# Patient Record
Sex: FEMALE | Race: WHITE | Employment: UNEMPLOYED | URBAN - METROPOLITAN AREA
[De-identification: names, ages, dates, MRNs, and addresses within clinical notes are randomized per-mention and may not be internally consistent; named-entity substitution may affect disease eponyms.]

---

## 2018-07-11 ENCOUNTER — HOSPITAL ENCOUNTER (OUTPATIENT)
Dept: GENERAL RADIOLOGY | Age: 33
Discharge: HOME | End: 2018-07-11
Attending: FAMILY MEDICINE
Payer: MEDICAID

## 2018-07-11 DIAGNOSIS — Z92.241 HISTORY OF RECENT STEROID USE: ICD-10-CM

## 2018-07-11 DIAGNOSIS — M34.1 CREST SYNDROME (HCC): ICD-10-CM

## 2018-07-11 DIAGNOSIS — M81.0 OSTEOPOROSIS: ICD-10-CM

## 2018-07-11 DIAGNOSIS — M54.9 BACK PAIN: ICD-10-CM

## 2018-07-11 PROCEDURE — 77080 DXA BONE DENSITY AXIAL: CPT

## 2018-10-10 ENCOUNTER — ANESTHESIA EVENT (OUTPATIENT)
Dept: ENDOSCOPY | Age: 33
End: 2018-10-10
Payer: MEDICAID

## 2018-10-10 RX ORDER — MONTELUKAST SODIUM 10 MG/1
10 TABLET ORAL
Refills: 4 | COMMUNITY
Start: 2018-09-15

## 2018-10-10 RX ORDER — MINOCYCLINE HYDROCHLORIDE 50 MG/1
1 CAPSULE ORAL DAILY
Refills: 2 | COMMUNITY
Start: 2018-09-05 | End: 2019-06-24

## 2018-10-10 RX ORDER — SERTRALINE HYDROCHLORIDE 100 MG/1
100 TABLET, FILM COATED ORAL DAILY
Refills: 5 | COMMUNITY
Start: 2018-10-02

## 2018-10-10 RX ORDER — FOLIC ACID 1 MG/1
1 TABLET ORAL DAILY
Refills: 2 | COMMUNITY
Start: 2018-09-15

## 2018-10-10 RX ORDER — BUDESONIDE AND FORMOTEROL FUMARATE DIHYDRATE 160; 4.5 UG/1; UG/1
2 AEROSOL RESPIRATORY (INHALATION) 2 TIMES DAILY
Refills: 12 | COMMUNITY
Start: 2018-09-15

## 2018-10-10 RX ORDER — AZELASTINE 1 MG/ML
2 SPRAY, METERED NASAL AS NEEDED
Refills: 5 | COMMUNITY
Start: 2018-10-01

## 2018-10-10 RX ORDER — METHOTREXATE 2.5 MG/1
10 TABLET ORAL
COMMUNITY

## 2018-10-10 RX ORDER — NIFEDIPINE 30 MG/1
30 TABLET, EXTENDED RELEASE ORAL 2 TIMES DAILY
Refills: 2 | COMMUNITY
Start: 2018-08-31

## 2018-10-10 RX ORDER — LIDOCAINE 40 MG/G
1 CREAM TOPICAL DAILY
Refills: 2 | COMMUNITY
Start: 2018-10-01

## 2018-10-10 RX ORDER — ASPIRIN 325 MG
325 TABLET ORAL DAILY
COMMUNITY
End: 2019-06-24

## 2018-10-10 RX ORDER — OMEPRAZOLE 20 MG/1
20 CAPSULE, DELAYED RELEASE ORAL DAILY
Refills: 1 | COMMUNITY
Start: 2018-10-01

## 2018-10-10 NOTE — PERIOP NOTES
PAT - SURGICAL PRE-ADMISSION INSTRUCTIONS 
 
NAME:  Bria Moore                                                          TODAY'S DATE:  10/10/2018 SURGERY DATE:  10/11/2018                                  SURGERY ARRIVAL TIME:   0900 1. Do NOT eat or drink anything, including candy or gum, after MIDNIGHT on 10/10/18 , unless you have specific instructions from your Surgeon or Anesthesia Provider to do so. 2. No smoking on the day of surgery. 3. No alcohol 24 hours prior to the day of surgery. 4. No recreational drugs for one week prior to the day of surgery. 5. Leave all valuables, including money/purse, at home. 6. Remove all jewelry, nail polish, makeup (including mascara); no lotions, powders, deodorant, or perfume/cologne/after shave. 7. Glasses/Contact lenses and Dentures may be worn to the hospital.  They will be removed prior to surgery. 8. Call your doctor if symptoms of a cold or illness develop within 24 ours prior to surgery. 9. AN ADULT MUST DRIVE YOU HOME AFTER OUTPATIENT SURGERY. 10. If you are having an OUTPATIENT procedure, please make arrangements for a responsible adult to be with you for 24 hours after your surgery. 11. If you are admitted to the hospital, you will be assigned to a bed after surgery is complete. Normally a family member will not be able to see you until you are in your assigned bed. 15. Family is encouraged to accompany you to the hospital.  We ask visitors in the treatment area to be limited to ONE person at a time to ensure patient privacy. EXCEPTIONS WILL BE MADE AS NEEDED. 15. Children under 12 are discouraged from entering the treatment area and need to be supervised by an adult when in the waiting room. Special Instructions: 
 
NONE. Patient Prep: 
 
shower with anti-bacterial soap These surgical instructions were reviewed with Nelsy Perdomo during the PAT phone call. Directions:   On the morning of surgery, please go to the [facility]. Enter the building from the [facility] entrance, 1st floor (next to the Emergency Room entrance). Take the elevator to the 2nd floor. Sign in at the Registration Desk. If you have any questions and/or concerns, please do not hesitate to call: 
(Prior to the day of surgery)  [facility] unit:  [phone] (Day of surgery)  [facility] unit:  [phone]

## 2018-10-11 ENCOUNTER — HOSPITAL ENCOUNTER (OUTPATIENT)
Age: 33
Setting detail: OUTPATIENT SURGERY
Discharge: HOME | End: 2018-10-11
Attending: INTERNAL MEDICINE | Admitting: INTERNAL MEDICINE
Payer: MEDICAID

## 2018-10-11 ENCOUNTER — ANESTHESIA (OUTPATIENT)
Dept: ENDOSCOPY | Age: 33
End: 2018-10-11
Payer: MEDICAID

## 2018-10-11 VITALS
TEMPERATURE: 97.4 F | HEART RATE: 72 BPM | DIASTOLIC BLOOD PRESSURE: 42 MMHG | BODY MASS INDEX: 25.4 KG/M2 | SYSTOLIC BLOOD PRESSURE: 93 MMHG | OXYGEN SATURATION: 94 % | HEIGHT: 62 IN | WEIGHT: 138 LBS | RESPIRATION RATE: 14 BRPM

## 2018-10-11 LAB — HCG UR QL: NEGATIVE

## 2018-10-11 PROCEDURE — 81025 URINE PREGNANCY TEST: CPT

## 2018-10-11 PROCEDURE — 77030009426 HC FCPS BIOP ENDOSC BSC -B: Performed by: INTERNAL MEDICINE

## 2018-10-11 PROCEDURE — 88305 TISSUE EXAM BY PATHOLOGIST: CPT | Performed by: INTERNAL MEDICINE

## 2018-10-11 PROCEDURE — 74011250636 HC RX REV CODE- 250/636

## 2018-10-11 PROCEDURE — 74011250636 HC RX REV CODE- 250/636: Performed by: NURSE ANESTHETIST, CERTIFIED REGISTERED

## 2018-10-11 PROCEDURE — 76060000031 HC ANESTHESIA FIRST 0.5 HR: Performed by: INTERNAL MEDICINE

## 2018-10-11 PROCEDURE — 76040000019: Performed by: INTERNAL MEDICINE

## 2018-10-11 RX ORDER — SODIUM CHLORIDE 0.9 % (FLUSH) 0.9 %
5-10 SYRINGE (ML) INJECTION AS NEEDED
Status: CANCELLED | OUTPATIENT
Start: 2018-10-11 | End: 2018-10-11

## 2018-10-11 RX ORDER — DEXTROMETHORPHAN/PSEUDOEPHED 2.5-7.5/.8
1.2 DROPS ORAL
Status: CANCELLED | OUTPATIENT
Start: 2018-10-11

## 2018-10-11 RX ORDER — SODIUM CHLORIDE, SODIUM LACTATE, POTASSIUM CHLORIDE, CALCIUM CHLORIDE 600; 310; 30; 20 MG/100ML; MG/100ML; MG/100ML; MG/100ML
50 INJECTION, SOLUTION INTRAVENOUS CONTINUOUS
Status: DISCONTINUED | OUTPATIENT
Start: 2018-10-12 | End: 2018-10-11 | Stop reason: HOSPADM

## 2018-10-11 RX ORDER — SODIUM CHLORIDE 0.9 % (FLUSH) 0.9 %
5-10 SYRINGE (ML) INJECTION EVERY 8 HOURS
Status: DISCONTINUED | OUTPATIENT
Start: 2018-10-11 | End: 2018-10-11 | Stop reason: HOSPADM

## 2018-10-11 RX ORDER — PROPOFOL 10 MG/ML
INJECTION, EMULSION INTRAVENOUS AS NEEDED
Status: DISCONTINUED | OUTPATIENT
Start: 2018-10-11 | End: 2018-10-11 | Stop reason: HOSPADM

## 2018-10-11 RX ORDER — SODIUM CHLORIDE 0.9 % (FLUSH) 0.9 %
5-10 SYRINGE (ML) INJECTION AS NEEDED
Status: DISCONTINUED | OUTPATIENT
Start: 2018-10-11 | End: 2018-10-11 | Stop reason: HOSPADM

## 2018-10-11 RX ORDER — SODIUM CHLORIDE 0.9 % (FLUSH) 0.9 %
5-10 SYRINGE (ML) INJECTION EVERY 8 HOURS
Status: CANCELLED | OUTPATIENT
Start: 2018-10-11 | End: 2018-10-11

## 2018-10-11 RX ORDER — FAMOTIDINE 20 MG/1
20 TABLET, FILM COATED ORAL ONCE
Status: DISCONTINUED | OUTPATIENT
Start: 2018-10-12 | End: 2018-10-11 | Stop reason: HOSPADM

## 2018-10-11 RX ADMIN — PROPOFOL 20 MG: 10 INJECTION, EMULSION INTRAVENOUS at 10:51

## 2018-10-11 RX ADMIN — PROPOFOL 60 MG: 10 INJECTION, EMULSION INTRAVENOUS at 10:48

## 2018-10-11 RX ADMIN — SODIUM CHLORIDE, SODIUM LACTATE, POTASSIUM CHLORIDE, AND CALCIUM CHLORIDE: 600; 310; 30; 20 INJECTION, SOLUTION INTRAVENOUS at 10:42

## 2018-10-11 RX ADMIN — PROPOFOL 20 MG: 10 INJECTION, EMULSION INTRAVENOUS at 10:53

## 2018-10-11 NOTE — PROCEDURES
Endoscopy Procedure Note    Patient: Naomi Delatorre MRN: [id number]  SSN: [id number]    YOB: 1985  Age: 28 y.o. Sex: female      Date/Time:  10/11/2018 11:09 AM    Esophagogastroduodenoscopy (EGD) Procedure Note    Procedure: Esophagogastroduodenoscopy with biopsy    IMPRESSION:   1. Mild gastritis. Biopsies taken for H. Pylori. 2. Otherwise normal EGD. Biopsies taken for Eosinophilic esophagitis. RECOMMENDATIONS:  1. Resume regular diet. 2. Continue omeprazole but increase to BID  3. Will contact with biopsy results in 2 weeks. 4. Follow up in 4 weeks. Indication: Abdominal pain, epigastric  :  Milton Garcia MD  Assistants: Endoscopy Technician-1: Blanca Dias  Endoscopy RN-1: Freda Florian RN    Referring Provider:   Destiny Liu MD  History: The history and physical exam were reviewed and updated. Endoscope: Olympus GIF-190 diagnostic endoscope  Extent of Exam: second portion of the duodenum  ASA: ASA 3 - Patient with moderate systemic disease with functional limitations  Anethesia/Sedation:  MAC anesthesia    Description of the procedure: The procedure was discussed with the patient including risks, benefits, alternatives including risks of iv sedation, bleeding, perforation and aspiration. A safety timeout was performed. The patient was placed in the left lateral decubitus position. A bite block was placed. The patient was given incremental doses of intravenous sedation until moderate sedation was achieved. The patients vital signs were monitored at all times including heart rate/rhythm, blood pressure and oxygen saturation. The endoscope was then passed under direct visualization to the second portion of the duodenum. The endoscope was then slowly withdrawn while visualizing the mucosa. In the stomach a retroflexion was performed and gastric fundus and cardia visualized. The endoscope was then slowly withdrawn.   The patient was then transferred to recovery in stable condition. Findings:    Esophagus: The esophageal mucosa was normal with no ulceration, mass or stricture. There was no evidence of Lucas's esophagus or reflux esophagitis. Four pinch biopsies taken for EoE. Stomach: The gastric mucosa was mildly erythematous and edematous in the antrum. No ulceration, mass, stricture. Four pinch biopsies taken for H. Pylori. Duodenum: The duodenum mucosa was normal with no ulceration, mass, stricture and no evidence of villous atrophy. Therapies:  None    Specimens:   ID Type Source Tests Collected by Time Destination   1 : gastric bxs Preservative Gastric  Joseline Mccauley MD 10/11/2018 1053 Pathology   2 : esophageal bxs Preservative Esophagus  Joseline Mccauley MD 10/11/2018 1055 Pathology               Complications:   None; patient tolerated the procedure well.     EBL:None    Discharge disposition:  Home in the company of  when able to ambulate    Joseline Mccauley MD  October 11, 2018  11:09 AM

## 2018-10-11 NOTE — ANESTHESIA PREPROCEDURE EVALUATION
Anesthetic History No history of anesthetic complications Review of Systems / Medical History Patient summary reviewed, nursing notes reviewed and pertinent labs reviewed Pulmonary Smoker Neuro/Psych Within defined limits Cardiovascular Exercise tolerance: >4 METS 
  
GI/Hepatic/Renal 
  
GERD: well controlled Endo/Other Within defined limits Other Findings Comments: Hx of gall bladder removal  
 
 
 
Physical Exam 
 
Airway Mallampati: I 
TM Distance: 4 - 6 cm Neck ROM: normal range of motion Mouth opening: Normal 
 
 Cardiovascular Regular rate and rhythm,  S1 and S2 normal,  no murmur, click, rub, or gallop Rhythm: regular Rate: normal 
 
 
 
 Dental 
No notable dental hx Pulmonary Breath sounds clear to auscultation Abdominal 
GI exam deferred Other Findings Anesthetic Plan ASA: 2 Anesthesia type: MAC Anesthetic plan and risks discussed with: Patient

## 2018-10-11 NOTE — PERIOP NOTES
Phase 2 Recovery Summary Patient arrived to Phase 2 at 46 Report received from [employer], [address] [facility] Vitals:  
 10/11/18 0946 10/11/18 0951 10/11/18 1058 10/11/18 1108 BP:  91/54 90/40 93/42 Pulse:  70 70 72 Resp:  16 14 Temp:  97.4 °F (36.3 °C) SpO2:  95% 94% 94% Weight: 62.6 kg (138 lb) Height: 5' 2\" (1.575 m)     
 
 
oriented to time, place, person and situation Lines and Drains Peripheral Intravenous Line:  
Peripheral IV 10/11/18 Left Hand (Active) Site Assessment Clean, dry, & intact 10/11/2018 11:08 AM  
Phlebitis Assessment 0 10/11/2018 11:08 AM  
Infiltration Assessment 0 10/11/2018 11:08 AM  
Dressing Status Clean, dry, & intact 10/11/2018 11:08 AM  
Dressing Type Tape;Transparent 10/11/2018 11:08 AM  
Hub Color/Line Status Pink 10/11/2018 11:08 AM  
 
 
Wound Patient arrived in endo recovery initially responding to voice only. IV fluids opened, BP returned to baseline and patient became more aroused and vocal.   
Dr. Ashley Bright at bedside discussing results with patient and boyfriend. ([phone]). Denies pain and discomfort at present time. Patient eager to get home and rest. Discharge instructions given by Bridget Guillen RN. Patient and significant other voiced understanding of instructions and patient taken to private vehicle via wheelchair. Patient exited facility in stable condition. Patient discharged to home with boyfriend Vazquez  at 12. Debo Turner

## 2018-10-11 NOTE — DISCHARGE INSTRUCTIONS
Patient Discharge Instructions    Devonte Sutherland / [id number] : 1985    Admitted 10/11/2018 Discharged: 10/11/2018         Procedure Impression:  1. Mild gastritis. Biopsies taken for H. Pylori. 2. Otherwise normal EGD. Biopsies taken for Eosinophilic esophagitis. Recommendation:  1. Resume regular diet. 2. Will contact with biopsy results in 2 weeks   3. Increase omeprazole to twice per day. 4. Follow up in office in 1 month    Recommended Diet: Regular Diet    Recommended Activity:    1. Do not drink alcohol, drive or operate machinery for 12 hours   2. Call if any fever, abdominal pain or bleeding noted. Signed By: Sole Boles MD     2018        DISCHARGE SUMMARY from Nurse    PATIENT INSTRUCTIONS:    After general anesthesia or intravenous sedation, for 24 hours or while taking prescription Narcotics:  · Limit your activities  · Do not drive and operate hazardous machinery  · Do not make important personal or business decisions  · Do  not drink alcoholic beverages  · If you have not urinated within 8 hours after discharge, please contact your surgeon on call. Report the following to your surgeon:  · Excessive pain, swelling, redness or odor of or around the surgical area  · Temperature over 100.5  · Nausea and vomiting lasting longer than 4 hours or if unable to take medications  · Any signs of decreased circulation or nerve impairment to extremity: change in color, persistent  numbness, tingling, coldness or increase pain  · Any questions    What to do at Home:  Recommended activity: Activity as tolerated and no driving for today,       These are general instructions for a healthy lifestyle:    No smoking/ No tobacco products/ Avoid exposure to second hand smoke  Surgeon General's Warning:  Quitting smoking now greatly reduces serious risk to your health.     Obesity, smoking, and sedentary lifestyle greatly increases your risk for illness    A healthy diet, regular physical exercise & weight monitoring are important for maintaining a healthy lifestyle    You may be retaining fluid if you have a history of heart failure or if you experience any of the following symptoms:  Weight gain of 3 pounds or more overnight or 5 pounds in a week, increased swelling in our hands or feet or shortness of breath while lying flat in bed. Please call your doctor as soon as you notice any of these symptoms; do not wait until your next office visit. Recognize signs and symptoms of STROKE:    F-face looks uneven    A-arms unable to move or move unevenly    S-speech slurred or non-existent    T-time-call 911 as soon as signs and symptoms begin-DO NOT go       Back to bed or wait to see if you get better-TIME IS BRAIN. Warning Signs of HEART ATTACK     Call 911 if you have these symptoms:   Chest discomfort. Most heart attacks involve discomfort in the center of the chest that lasts more than a few minutes, or that goes away and comes back. It can feel like uncomfortable pressure, squeezing, fullness, or pain.  Discomfort in other areas of the upper body. Symptoms can include pain or discomfort in one or both arms, the back, neck, jaw, or stomach.  Shortness of breath with or without chest discomfort.  Other signs may include breaking out in a cold sweat, nausea, or lightheadedness. Don't wait more than five minutes to call 911 - MINUTES MATTER! Fast action can save your life. Calling 911 is almost always the fastest way to get lifesaving treatment. Emergency Medical Services staff can begin treatment when they arrive -- up to an hour sooner than if someone gets to the hospital by car. The discharge information has been reviewed with the patient. The patient verbalized understanding.   Discharge medications reviewed with the patient and appropriate educational materials and side effects teaching were provided. ___________________________________________________________________________________________________________________________________    Patient armband removed and given to patient to take home.   Patient was informed of the privacy risks if armband lost or stolen

## 2018-10-11 NOTE — IP AVS SNAPSHOT
[address] [address] [address] 
[phone] Patient: Amadou Huber MRN: [id number] :1985 About your hospitalization You were admitted on:  2018 You last received care in the:  [facility] PHASE 2 RECOVERY You were discharged on:  2018 Why you were hospitalized Your primary diagnosis was:  Not on File Follow-up Information Follow up With Details Comments Contact Info Benito Fam MD   [address] [place] [id number] [phone] Discharge Orders None A check jarvis indicates which time of day the medication should be taken. My Medications CONTINUE taking these medications Instructions Each Dose to Equal  
 Morning Noon Evening Bedtime  
 aspirin 325 mg tablet Commonly known as:  ASPIRIN Your last dose was: Your next dose is: Take 325 mg by mouth daily. 325 mg  
    
   
   
   
  
 azelastine 137 mcg (0.1 %) nasal spray Commonly known as:  ASTELIN Your last dose was: Your next dose is: 2 Sprays by Both Nostrils route as needed. 2 Spray  
    
   
   
   
  
 folic acid 1 mg tablet Commonly known as:  Google Your last dose was: Your next dose is: Take 1 mg by mouth daily. 1 mg  
    
   
   
   
  
 lidocaine 4 % topical cream  
Commonly known as:  XYLOCAINE Your last dose was: Your next dose is:    
   
   
 Apply 1 Squirt to affected area daily. 1 Squirt  
    
   
   
   
  
 methotrexate 2.5 mg tablet Commonly known as:  Sabina Combs Your last dose was: Your next dose is: Take 10 mg by mouth Every Thursday. 10 mg  
    
   
   
   
  
 minocycline 50 mg capsule Commonly known as:  Almer Clause Your last dose was: Your next dose is: Take 1 Cap by mouth daily. 1 Cap montelukast 10 mg tablet Commonly known as:  SINGULAIR Your last dose was: Your next dose is: Take 10 mg by mouth nightly. 10 mg  
    
   
   
   
  
 NIFEdipine ER 30 mg ER tablet Commonly known as:  PROCARDIA XL Your last dose was: Your next dose is: Take 30 mg by mouth two (2) times a day. 30 mg  
    
   
   
   
  
 nitroglycerin 2 % ointment Commonly known as:  NITROBID Your last dose was: Your next dose is:    
   
   
 as needed. omeprazole 20 mg capsule Commonly known as:  PRILOSEC Your last dose was: Your next dose is: Take 20 mg by mouth daily. 20 mg PROBIOTIC 4X 10-15 mg Tbec Generic drug:  B.infantis-B.ani-B.long-B.bifi Your last dose was: Your next dose is: Take  by mouth daily. sertraline 100 mg tablet Commonly known as:  ZOLOFT Your last dose was: Your next dose is: Take 100 mg by mouth daily. 100 mg  
    
   
   
   
  
 SYMBICORT 160-4.5 mcg/actuation Hfaa Generic drug:  budesonide-formoterol Your last dose was: Your next dose is: Take 2 Puffs by inhalation two (2) times a day. 2 Puff Discharge Instructions Patient Discharge Instructions Amena Curtis / [id number] : 1985 Admitted 10/11/2018 Discharged: 10/11/2018 Procedure Impression: 1. Mild gastritis. Biopsies taken for H. Pylori. 2. Otherwise normal EGD. Biopsies taken for Eosinophilic esophagitis. Recommendation: 1. Resume regular diet. 2. Will contact with biopsy results in 2 weeks 3. Increase omeprazole to twice per day. 4. Follow up in office in 1 month Recommended Diet: Regular Diet Recommended Activity: 1. Do not drink alcohol, drive or operate machinery for 12 hours 2. Call if any fever, abdominal pain or bleeding noted. Signed By: Melanie Kern MD   
 October 11, 2018 DISCHARGE SUMMARY from Nurse PATIENT INSTRUCTIONS: 
 
After general anesthesia or intravenous sedation, for 24 hours or while taking prescription Narcotics: · Limit your activities · Do not drive and operate hazardous machinery · Do not make important personal or business decisions · Do  not drink alcoholic beverages · If you have not urinated within 8 hours after discharge, please contact your surgeon on call. Report the following to your surgeon: 
· Excessive pain, swelling, redness or odor of or around the surgical area · Temperature over 100.5 · Nausea and vomiting lasting longer than 4 hours or if unable to take medications · Any signs of decreased circulation or nerve impairment to extremity: change in color, persistent  numbness, tingling, coldness or increase pain · Any questions What to do at Home: 
Recommended activity: Activity as tolerated and no driving for today, These are general instructions for a healthy lifestyle: No smoking/ No tobacco products/ Avoid exposure to second hand smoke Surgeon General's Warning:  Quitting smoking now greatly reduces serious risk to your health. Obesity, smoking, and sedentary lifestyle greatly increases your risk for illness A healthy diet, regular physical exercise & weight monitoring are important for maintaining a healthy lifestyle You may be retaining fluid if you have a history of heart failure or if you experience any of the following symptoms:  Weight gain of 3 pounds or more overnight or 5 pounds in a week, increased swelling in our hands or feet or shortness of breath while lying flat in bed. Please call your doctor as soon as you notice any of these symptoms; do not wait until your next office visit. Recognize signs and symptoms of STROKE: 
 
F-face looks uneven A-arms unable to move or move unevenly S-speech slurred or non-existent T-time-call 911 as soon as signs and symptoms begin-DO NOT go Back to bed or wait to see if you get better-TIME IS BRAIN. Warning Signs of HEART ATTACK Call 911 if you have these symptoms: 
? Chest discomfort. Most heart attacks involve discomfort in the center of the chest that lasts more than a few minutes, or that goes away and comes back. It can feel like uncomfortable pressure, squeezing, fullness, or pain. ? Discomfort in other areas of the upper body. Symptoms can include pain or discomfort in one or both arms, the back, neck, jaw, or stomach. ? Shortness of breath with or without chest discomfort. ? Other signs may include breaking out in a cold sweat, nausea, or lightheadedness. Don't wait more than five minutes to call [address]! Fast action can save your life. Calling 911 is almost always the fastest way to get lifesaving treatment. Emergency Medical Services staff can begin treatment when they arrive  up to an hour sooner than if someone gets to the hospital by car. The discharge information has been reviewed with the patient. The patient verbalized understanding. Discharge medications reviewed with the patient and appropriate educational materials and side effects teaching were provided. ___________________________________________________________________________________________________________________________________ Patient armband removed and given to patient to take home. Patient was informed of the privacy risks if armband lost or stolen Introducing \[facility] [place] [facility]\"" & HEALTH SERVICES! [facility] [employer] introduces [employer] patient portal. Now you can access parts of your medical record, email your doctor's office, and request medication refills online.    
 
1. In your internet browser, go to [email or web address] 2. Click on the First Time User? Click Here link in the Sign In box. You will see the New Member Sign Up page. 3. Enter your [employer] Access Code exactly as it appears below. You will not need to use this code after youve completed the sign-up process. If you do not sign up before the expiration date, you must request a new code. · [employer] Access Code: [email or web address] Expires: 1/9/2019  9:02 AM 
 
4. Enter the last four digits of your Social Security Number (xxxx) and Date of Birth (mm/dd/yyyy) as indicated and click Submit. You will be taken to the next sign-up page. 5. Create a [employer] ID. This will be your [employer] login ID and cannot be changed, so think of one that is secure and easy to remember. 6. Create a [employer] password. You can change your password at any time. 7. Enter your Password Reset Question and Answer. This can be used at a later time if you forget your password. 8. Enter your e-mail address. You will receive e-mail notification when new information is available in [address] 9. Click Sign Up. You can now view and download portions of your medical record. 10. Click the Download Summary menu link to download a portable copy of your medical information. If you have questions, please visit the Frequently Asked Questions section of the [employer] website. Remember, [employer] is NOT to be used for urgent needs. For medical emergencies, dial 911. Now available from your iPhone and Android! Introducing Kingsley Zaragoza As a [facility] patient, I wanted to make you aware of our electronic visit tool called Kingsley Zaragoza. [facility] 24/7 allows you to connect within minutes with a medical provider 24 hours a day, seven days a week via a mobile device or tablet or logging into a secure website from your computer. You can access Kingsley Zaragoza from anywhere in the [place]. A virtual visit might be right for you when you have a simple condition and feel like you just dont want to get out of bed, or cant get away from work for an appointment, when your regular [employer] provider is not available (evenings, weekends or holidays), or when youre out of town and need minor care. Electronic visits cost only $49 and if the [employer] 24/7 provider determines a prescription is needed to treat your condition, one can be electronically transmitted to a nearby pharmacy*. Please take a moment to enroll today if you have not already done so. The enrollment process is free and takes just a few minutes. To enroll, please download the [employer] 24/7 antonia to your tablet or phone, or visit [email or web address] to enroll on your computer. And, as an [address] [place] [address] patient with a [employer] account, the results of your visits will be scanned into your electronic medical record and your primary care provider will be able to view the scanned results. We urge you to continue to see your regular [employer] provider for your ongoing medical care. And while your primary care provider may not be the one available when you seek a [employer]ley [employer] virtual visit, the peace of mind you get from getting a real diagnosis real time can be priceless. For more information on Kingsley Sparkle mobile [employer], view our Frequently Asked Questions (FAQs) at [email or web address]. Sincerely, 
 
Jj Calzada MD 
Chief Medical Officer [employer] *:  certain medications cannot be prescribed via [employer] Providers Seen During Your Hospitalization Provider Specialty Primary office phone Dimple Perez MD Gastroenterology [phone] Your Primary Care Physician (PCP) Primary Care Physician Office Phone Office Fax Nathaly July [phone] You are allergic to the following No active allergies Recent Documentation Height Weight BMI OB Status Smoking Status 1.575 m 62.6 kg 25.24 kg/m2 Having regular periods Light Tobacco Smoker Emergency Contacts Name Discharge Info Relation Home Work Mobile [address] CAREGIVER [3] Friend [5] [phone] Patient Belongings The following personal items are in your possession at time of discharge: 
  Dental Appliances: None  Visual Aid: None Please provide this summary of care documentation to your next provider. Signatures-by signing, you are acknowledging that this After Visit Summary has been reviewed with you and you have received a copy. Patient Signature:  ____________________________________________________________ Date:  ____________________________________________________________  
  
[facility] Signature:  ____________________________________________________________ Date:  ____________________________________________________________

## 2018-10-11 NOTE — H&P
Date of Surgery Update: 
Tod Pugh was seen and examined. History and physical has been reviewed. The patient has been examined.  There have been no significant clinical changes since the completion of the originally dated History and Physical. 
 
Signed By: Shaji Valle MD   
 October 11, 2018 10:43 AM

## 2018-10-11 NOTE — ANESTHESIA POSTPROCEDURE EVALUATION
Post-Anesthesia Evaluation and Assessment Patient: Ken Hidalgo MRN: [id number]  SSN: [id number] YOB: 1985  Age: 28 y.o. Sex: female Cardiovascular Function/Vital Signs Visit Vitals  BP 90/40  Pulse 70  Temp 36.3 °C (97.4 °F)  Resp 14  
 Ht 5' 2\" (1.575 m)  Wt 62.6 kg (138 lb)  SpO2 94%  BMI 25.24 kg/m2 Patient is status post MAC anesthesia for Procedure(s): ESOPHAGOGASTRODUODENOSCOPY (EGD) w/ biopsies. Nausea/Vomiting: None Postoperative hydration reviewed and adequate. Pain: 
Pain Scale 1: Numeric (0 - 10) (10/11/18 5727) Pain Intensity 1: 0 (10/11/18 2516) Managed Neurological Status: At baseline Mental Status and Level of Consciousness: Alert and oriented Pulmonary Status:  
O2 Device: Room air (10/11/18 1058) Adequate oxygenation and airway patent Complications related to anesthesia: None Post-anesthesia assessment completed. No concerns Signed By: Mallory Iverson CRNA October 11, 2018

## 2019-07-17 ENCOUNTER — ANESTHESIA EVENT (OUTPATIENT)
Dept: ENDOSCOPY | Age: 34
End: 2019-07-17
Payer: MEDICAID

## 2019-07-17 NOTE — PERIOP NOTES
PAT - SURGICAL PRE-ADMISSION INSTRUCTIONS    NAME:  Roel Garcia                                                          TODAY'S DATE:  7/17/2019    SURGERY DATE:  7/18/2019                                  SURGERY ARRIVAL TIME:   0730    1. Do NOT eat or drink anything, including candy or gum, after MIDNIGHT on 7/17/19 , unless you have specific instructions from your Surgeon or Anesthesia Provider to do so. 2. No smoking on the day of surgery. 3. No alcohol 24 hours prior to the day of surgery. 4. No recreational drugs for one week prior to the day of surgery. 5. Leave all valuables, including money/purse, at home. 6. Remove all jewelry, nail polish, makeup (including mascara); no lotions, powders, deodorant, or perfume/cologne/after shave. 7. Glasses/Contact lenses and Dentures may be worn to the hospital.  They will be removed prior to surgery. 8. Call your doctor if symptoms of a cold or illness develop within 24 ours prior to surgery. 9. AN ADULT MUST DRIVE YOU HOME AFTER OUTPATIENT SURGERY. 10. If you are having an OUTPATIENT procedure, please make arrangements for a responsible adult to be with you for 24 hours after your surgery. 11. If you are admitted to the hospital, you will be assigned to a bed after surgery is complete. Normally a family member will not be able to see you until you are in your assigned bed. 15. Family is encouraged to accompany you to the hospital.  We ask visitors in the treatment area to be limited to ONE person at a time to ensure patient privacy. EXCEPTIONS WILL BE MADE AS NEEDED. 15. Children under 12 are discouraged from entering the treatment area and need to be supervised by an adult when in the waiting room. Special Instructions: Take these medications the morning of surgery with a sip of water:  NIFEDIPINE, SYMBICORT, Complete bowel prep per MD instructions.     Patient Prep:    shower with anti-bacterial soap    These surgical instructions were reviewed with PATIENT during the PAT PHONE CALL. Directions: On the morning of surgery, please go to the [address] [place] [address]. Enter the building from the [facility] entrance, 1st floor (next to the Emergency Room entrance). Take the elevator to the 2nd floor. Sign in at the Registration Desk.     If you have any questions and/or concerns, please do not hesitate to call:  (Prior to the day of surgery)  [facility] unit:  [phone]  (Day of surgery)  [address] unit:  [phone]

## 2019-07-18 ENCOUNTER — HOSPITAL ENCOUNTER (OUTPATIENT)
Age: 34
Setting detail: OUTPATIENT SURGERY
Discharge: HOME | End: 2019-07-18
Attending: INTERNAL MEDICINE | Admitting: INTERNAL MEDICINE
Payer: MEDICAID

## 2019-07-18 ENCOUNTER — ANESTHESIA (OUTPATIENT)
Dept: ENDOSCOPY | Age: 34
End: 2019-07-18
Payer: MEDICAID

## 2019-07-18 VITALS
HEIGHT: 62 IN | SYSTOLIC BLOOD PRESSURE: 103 MMHG | BODY MASS INDEX: 24.97 KG/M2 | OXYGEN SATURATION: 98 % | WEIGHT: 135.7 LBS | HEART RATE: 62 BPM | TEMPERATURE: 97.6 F | RESPIRATION RATE: 16 BRPM | DIASTOLIC BLOOD PRESSURE: 65 MMHG

## 2019-07-18 LAB — HCG UR QL: NEGATIVE

## 2019-07-18 PROCEDURE — 81025 URINE PREGNANCY TEST: CPT

## 2019-07-18 PROCEDURE — 77030031670 HC DEV INFL ENDOTEK BIG60 MRTM -B: Performed by: INTERNAL MEDICINE

## 2019-07-18 PROCEDURE — 88305 TISSUE EXAM BY PATHOLOGIST: CPT

## 2019-07-18 PROCEDURE — 77030021593 HC FCPS BIOP ENDOSC BSC -A: Performed by: INTERNAL MEDICINE

## 2019-07-18 PROCEDURE — 74011250636 HC RX REV CODE- 250/636

## 2019-07-18 PROCEDURE — 74011250636 HC RX REV CODE- 250/636: Performed by: INTERNAL MEDICINE

## 2019-07-18 PROCEDURE — 76060000031 HC ANESTHESIA FIRST 0.5 HR: Performed by: INTERNAL MEDICINE

## 2019-07-18 PROCEDURE — 76040000019: Performed by: INTERNAL MEDICINE

## 2019-07-18 RX ORDER — SODIUM CHLORIDE, SODIUM LACTATE, POTASSIUM CHLORIDE, CALCIUM CHLORIDE 600; 310; 30; 20 MG/100ML; MG/100ML; MG/100ML; MG/100ML
50 INJECTION, SOLUTION INTRAVENOUS CONTINUOUS
Status: DISCONTINUED | OUTPATIENT
Start: 2019-07-19 | End: 2019-07-18

## 2019-07-18 RX ORDER — LIDOCAINE HYDROCHLORIDE 20 MG/ML
INJECTION, SOLUTION EPIDURAL; INFILTRATION; INTRACAUDAL; PERINEURAL AS NEEDED
Status: DISCONTINUED | OUTPATIENT
Start: 2019-07-18 | End: 2019-07-18 | Stop reason: HOSPADM

## 2019-07-18 RX ORDER — SODIUM CHLORIDE 0.9 % (FLUSH) 0.9 %
5-40 SYRINGE (ML) INJECTION AS NEEDED
Status: DISCONTINUED | OUTPATIENT
Start: 2019-07-18 | End: 2019-07-18 | Stop reason: HOSPADM

## 2019-07-18 RX ORDER — FAMOTIDINE 20 MG/1
20 TABLET, FILM COATED ORAL
Status: DISCONTINUED | OUTPATIENT
Start: 2019-07-18 | End: 2019-07-18 | Stop reason: HOSPADM

## 2019-07-18 RX ORDER — DEXTROMETHORPHAN/PSEUDOEPHED 2.5-7.5/.8
1.2 DROPS ORAL
Status: DISCONTINUED | OUTPATIENT
Start: 2019-07-18 | End: 2019-07-18 | Stop reason: HOSPADM

## 2019-07-18 RX ORDER — SODIUM CHLORIDE, SODIUM LACTATE, POTASSIUM CHLORIDE, CALCIUM CHLORIDE 600; 310; 30; 20 MG/100ML; MG/100ML; MG/100ML; MG/100ML
50 INJECTION, SOLUTION INTRAVENOUS CONTINUOUS
Status: DISCONTINUED | OUTPATIENT
Start: 2019-07-18 | End: 2019-07-18 | Stop reason: HOSPADM

## 2019-07-18 RX ORDER — SODIUM CHLORIDE 0.9 % (FLUSH) 0.9 %
5-40 SYRINGE (ML) INJECTION EVERY 8 HOURS
Status: DISCONTINUED | OUTPATIENT
Start: 2019-07-18 | End: 2019-07-18 | Stop reason: HOSPADM

## 2019-07-18 RX ORDER — PROPOFOL 10 MG/ML
INJECTION, EMULSION INTRAVENOUS AS NEEDED
Status: DISCONTINUED | OUTPATIENT
Start: 2019-07-18 | End: 2019-07-18 | Stop reason: HOSPADM

## 2019-07-18 RX ADMIN — PROPOFOL 10 MG: 10 INJECTION, EMULSION INTRAVENOUS at 09:45

## 2019-07-18 RX ADMIN — PROPOFOL 10 MG: 10 INJECTION, EMULSION INTRAVENOUS at 09:49

## 2019-07-18 RX ADMIN — PROPOFOL 30 MG: 10 INJECTION, EMULSION INTRAVENOUS at 09:41

## 2019-07-18 RX ADMIN — PROPOFOL 20 MG: 10 INJECTION, EMULSION INTRAVENOUS at 09:43

## 2019-07-18 RX ADMIN — LIDOCAINE HYDROCHLORIDE 50 MG: 20 INJECTION, SOLUTION EPIDURAL; INFILTRATION; INTRACAUDAL; PERINEURAL at 09:39

## 2019-07-18 RX ADMIN — SODIUM CHLORIDE, SODIUM LACTATE, POTASSIUM CHLORIDE, AND CALCIUM CHLORIDE 50 ML/HR: 600; 310; 30; 20 INJECTION, SOLUTION INTRAVENOUS at 08:58

## 2019-07-18 RX ADMIN — PROPOFOL 10 MG: 10 INJECTION, EMULSION INTRAVENOUS at 09:50

## 2019-07-18 RX ADMIN — PROPOFOL 80 MG: 10 INJECTION, EMULSION INTRAVENOUS at 09:40

## 2019-07-18 RX ADMIN — PROPOFOL 10 MG: 10 INJECTION, EMULSION INTRAVENOUS at 09:51

## 2019-07-18 RX ADMIN — PROPOFOL 10 MG: 10 INJECTION, EMULSION INTRAVENOUS at 09:44

## 2019-07-18 RX ADMIN — PROPOFOL 20 MG: 10 INJECTION, EMULSION INTRAVENOUS at 09:42

## 2019-07-18 RX ADMIN — PROPOFOL 10 MG: 10 INJECTION, EMULSION INTRAVENOUS at 09:48

## 2019-07-18 RX ADMIN — PROPOFOL 10 MG: 10 INJECTION, EMULSION INTRAVENOUS at 09:47

## 2019-07-18 RX ADMIN — PROPOFOL 10 MG: 10 INJECTION, EMULSION INTRAVENOUS at 09:46

## 2019-07-18 NOTE — PERIOP NOTES
Pre-Op Summary    Pt arrived via car with family/friend and is oriented to time, place, person and situation. Patient with steady gait with none assistive devices. Visit Vitals  BP 90/55   Pulse 60   Temp 97.6 °F (36.4 °C)   Resp 18   Ht 5' 2\" (1.575 m)   Wt 61.6 kg (135 lb 11.2 oz)   LMP 06/21/2019 (Approximate)   SpO2 93%   Breastfeeding? No   BMI 24.82 kg/m²           Patients belongings are located with patient. Patient's point of contact is Andrés Mora, boyfriend and their contact number is: [phone]. They will be in the waiting room. They are able to receive medication information. They will be their ride home.

## 2019-07-18 NOTE — PROCEDURES
Colonoscopy Report    Patient: Napoleon Castillo MRN: [id number]  SSN: [id number]    YOB: 1985  Age: 28 y.o. Sex: female      Date of Procedure: 7/18/2019    IMPRESSION:  1. Hemorrhoids. 2. Otherwise normal colonoscopy including terminal ileum. Biopsies taken to rule out microscopic colitis. RECOMMENDATIONS:  1. Resume regular diet, Recommend high fiber. 2. Will contact with biopsy results in 2 weeks. Indication:  Diarrhea, Lower rectal bleeding  Procedure Performed: Colonoscopy biopsy  Endoscopist: Spencer Armas MD  Assistant: Endoscopy Technician-1: Krystal Chavarria CNA  Endoscopy RN-1: Alecia Cuevas RN  ASA: ASA 3 - Patient with moderate systemic disease with functional limitations  Mallampati Score: II (soft palate, uvula, fauces visible)  Anesthesia: MAC anesthesia  Endoscope:     [x]  CF H 190AL   []  PCF H190AL   []  GIF H 190    Extent of Examination:terminal ileum  Quality of Preparation:     []  Excellent   [x]  Very Good   [] Fair but adequate   [] Fair, inadequate   []  Poor      Technique: The procedure was discussed with the patient including risks, benefits, alternatives including risks of IV sedation, bleeding, perforation and missed polyp. A safety timeout was performed. The patient was given incremental doses of intravenous sedation to achieve moderate sedation. The patients vital signs were monitored at all times including heart rate, rhythm, blood pressure and oxygen saturation. The patient was placed in left lateral position. When adequate sedation was achieved a perianal inspection and a digital rectal exam were performed. Video colonoscope was introduced into the rectum and advanced under direct vision up to the terminal ileum. The cecum was identified by IC valve, appendiceal orifice and crows foot. With adequate insufflation and maneuvering of the withdrawing scope, the colonic mucosa was visualized carefully.  Retroflexion was performed in the rectum and the distal rectum visualized. The patient tolerated the procedure very well and was transferred to recovery area. Findings:  1. Normal rectal exam.   2. Normal terminal ileum with no ulceration, mass, stricture. 3. There were small hemorrhoids in the distal rectum. 4. The colonic mucosa was otherwise normal with no polyps, masses, ulcerations, strictures. Two pinch biopsies taken in the ascending colon to rule out microscopic colitis.        EBL:None  Specimen:   ID Type Source Tests Collected by Time Destination   1 : bx r/o microscopic colitis  Preservative Colon, Ascending  Jaswinder Mccallum MD 7/18/2019 8815 Pathology       Diana Schwab MD  July 18, 2019  9:55 AM

## 2019-07-18 NOTE — H&P
History and Physical    Madeline Bran        1985  [id number]        [id number]     Pre-Procedure Diagnosis:  r19.4  change in bowel function    Chief Complaint:  No chief complaint on file. HPI: Patient with diarrhea with new onset bleeding associated. No melena. She has cramping type pain. No weight loss. No nausea or vomiting. No fevers or chills. No other complaints. Past Medical History:   Diagnosis Date    Connective tissue disease, undifferentiated ([facility] [address]     GERD (gastroesophageal reflux disease)     H/O seasonal allergies     Psychiatric disorder     depression    Raynaud's disease     Scleroderma, limited ([facility] [address]     Vertigo      Past Surgical History:   Procedure Laterality Date    HX  SECTION      HX CHOLECYSTECTOMY      HX TUBAL LIGATION      HX WISDOM TEETH EXTRACTION       History reviewed. No pertinent family history. Social History     Socioeconomic History    Marital status: SINGLE     Spouse name: Not on file    Number of children: Not on file    Years of education: Not on file    Highest education level: Not on file   Tobacco Use    Smoking status: Light Tobacco Smoker    Smokeless tobacco: Never Used    Tobacco comment: ONLY 1-3 CIGS DAILY   Substance and Sexual Activity    Alcohol use: No    Drug use: No       Allergies:  No Known Allergies  Medications:   No current facility-administered medications for this encounter. Vital Signs   Visit Vitals  Ht 5' 2\" (1.575 m)   Wt 61.7 kg (136 lb)   LMP 2019 (Approximate)   BMI 24.87 kg/m²       Review of Systems  A comprehensive review of systems was negative except for that written in the History of Present Illness. Physical Exam:  General:  Alert, cooperative, no distress, appears stated age. Eyes:  Conjunctivae/corneas clear. PERRL, EOMs intact.  Fundi benign           Mouth/Throat: Lips, mucosa, and tongue normal. Teeth and gums normal.   Neck: Supple, symmetrical, trachea midline, no adenopathy, thyroid: no enlargement/tenderness/nodules, no carotid bruit and no JVD. Lungs:   Clear to auscultation bilaterally. Heart:  Regular rate and rhythm, S1, S2 normal, no murmur, click, rub or gallop. Abdomen:   Soft, non-tender. Bowel sounds normal. No masses,  No organomegaly. Extremities: Extremities normal, atraumatic, no cyanosis or edema. Skin: Skin color, texture, turgor normal. No rashes or lesions             Laboratory Data:  No results found for this or any previous visit (from the past 24 hour(s)). Impression and Plan:  Diarrhea with blood in stool. Recommend colonoscopy.  See prior H&P      Roxanne Zapata MD  7/18/2019  8:08 AM

## 2019-07-18 NOTE — DISCHARGE INSTRUCTIONS
Patient Discharge Instructions    Suraj Chaudhari / [id number] : 1985    Admitted 2019 Discharged: 2019         Procedure Impression:  1. Hemorrhoids. 2. Otherwise normal colonoscopy including terminal ileum. Biopsies taken to rule out microscopic colitis. Recommendation:  1. Resume regular diet, recommend high fiber  2. Will contact with biopsy results in 2 weeks. Recommended Diet: Regular Diet    Recommended Activity:    1. Do not drink alcohol, drive or operate machinery for 12 hours   2. Call if any fever, abdominal pain or bleeding noted. Signed By: Oli Lipscomb MD     2019               DISCHARGE SUMMARY from Nurse    PATIENT INSTRUCTIONS:    After general anesthesia or intravenous sedation, for 24 hours or while taking prescription Narcotics:  · Limit your activities  · Do not drive and operate hazardous machinery  · Do not make important personal or business decisions  · Do  not drink alcoholic beverages  · If you have not urinated within 8 hours after discharge, please contact your surgeon on call. Report the following to your surgeon:  · Excessive pain, swelling, redness or odor of or around the surgical area  · Temperature over 100.5  · Nausea and vomiting lasting longer than 4 hours or if unable to take medications  · Any signs of decreased circulation or nerve impairment to extremity: change in color, persistent  numbness, tingling, coldness or increase pain  · Any questions    What to do at Home:  Recommended activity: Activity as tolerated,       *  Please give a list of your current medications to your Primary Care Provider. *  Please update this list whenever your medications are discontinued, doses are      changed, or new medications (including over-the-counter products) are added. *  Please carry medication information at all times in case of emergency situations.     These are general instructions for a healthy lifestyle:    No smoking/ No tobacco products/ Avoid exposure to second hand smoke  Surgeon General's Warning:  Quitting smoking now greatly reduces serious risk to your health. Obesity, smoking, and sedentary lifestyle greatly increases your risk for illness    A healthy diet, regular physical exercise & weight monitoring are important for maintaining a healthy lifestyle    You may be retaining fluid if you have a history of heart failure or if you experience any of the following symptoms:  Weight gain of 3 pounds or more overnight or 5 pounds in a week, increased swelling in our hands or feet or shortness of breath while lying flat in bed. Please call your doctor as soon as you notice any of these symptoms; do not wait until your next office visit. Patient armband removed and shreddedPatient Education        Learning About Colonoscopy  What is a colonoscopy? A colonoscopy is a test (also called a procedure) that lets a doctor look inside your large intestine. The doctor uses a thin, lighted tube called a colonoscope. The doctor uses it to look for small growths called polyps, colon or rectal cancer (colorectal cancer), or other problems like bleeding. During the procedure, the doctor can take samples of tissue. The samples can then be checked for cancer or other conditions. The doctor can also take out polyps. How is colonoscopy done? This procedure is done in a doctor's office or a clinic or hospital. You will get medicine to help you relax and not feel pain. Some people find that they do not remember having the test because of the medicine. The doctor gently moves the colonoscope, or scope, through the colon. The scope is also a small video camera. It lets the doctor see the colon and take pictures. A colonoscopy usually takes 30 to 45 minutes. It may take longer if the doctor has to remove polyps. How do you prepare for the procedure?   You need to clean out your colon before the procedure so the doctor can see all of your colon. You may start the cleaning process a day or two before the test. This depends on which \"colon prep\" your doctor recommends. To clean your colon, you stop eating solid foods and drink only clear liquids. You can have water, tea, coffee, clear juices, clear broths, flavored ice pops, and gelatin (such as Jell-O). Do not drink anything red or purple, such as grape juice or fruit punch. And do not eat red or purple foods, such as grape ice pops or cherry gelatin. The day or night before the procedure, you drink a large amount of a special liquid. This causes loose, frequent stools. You will go to the bathroom a lot. It is very important to drink all of the colon prep liquid. If you have problems drinking the liquid, call your doctor. For many people, the prep is worse than the test. It may be uncomfortable, and you may feel hungry on the clear liquid diet. Some people do not go to work or do their usual activities on the day of the prep. Arrange to have someone take you home after the test.  What can you expect after a colonoscopy? The nurses will watch you for 1 to 2 hours until the medicines wear off. Then you can go home. You will need a ride. Your doctor will tell you when you can eat and do your usual activities. Your doctor will talk to you about when you will need your next colonoscopy. The results of your test and your risk for colorectal cancer will help your doctor decide how often you need to be checked. Follow-up care is a key part of your treatment and safety. Be sure to make and go to all appointments, and call your doctor if you are having problems. It's also a good idea to know your test results and keep a list of the medicines you take. Where can you learn more? Go to http://marycruz-dorene.info/. Enter E054 in the search box to learn more about \"Learning About Colonoscopy. \"  Current as of: March 27, 2018  Content Version: 11.9  © 4287-9873 Healthwise, Incorporated. Care instructions adapted under license by [employer] (which disclaims liability or warranty for this information). If you have questions about a medical condition or this instruction, always ask your healthcare professional. [address] any warranty or liability for your use of this information. The discharge information has been reviewed with the caregiver. The caregiver verbalized understanding. Discharge medications reviewed with the caregiver and appropriate educational materials and side effects teaching were provided.   ___________________________________________________________________________________________________________________________________

## 2019-07-18 NOTE — PERIOP NOTES
Pre-Op Summary    Pt arrived via car with family/friend and is oriented to time, place, person and situation. Patient with steady gait with none assistive devices. Visit Vitals  BP 90/55   Pulse 60   Temp 97.6 °F (36.4 °C)   Resp 18   Ht 5' 2\" (1.575 m)   Wt 61.6 kg (135 lb 11.2 oz)   LMP 06/21/2019 (Approximate)   SpO2 93%   Breastfeeding? No   BMI 24.82 kg/m²       Peripheral IV located on Right forearm. Patients belongings are located with pt. Patient's point of contact is friend Aníbal Jenkins and their contact number is: [phone]. They will be in the waiting room. They are able to receive medication information. They will be their ride home.

## 2019-07-18 NOTE — ANESTHESIA PREPROCEDURE EVALUATION
Relevant Problems   No relevant active problems       Anesthetic History   No history of anesthetic complications            Review of Systems / Medical History  Patient summary reviewed and pertinent labs reviewed    Pulmonary  Within defined limits                 Neuro/Psych   Within defined limits           Cardiovascular  Within defined limits                Exercise tolerance: >4 METS     GI/Hepatic/Renal  Within defined limits              Endo/Other  Within defined limits           Other Findings   Comments: scleroderma                 Physical Exam    Airway  Mallampati: II  TM Distance: 4 - 6 cm  Neck ROM: normal range of motion   Mouth opening: Normal     Cardiovascular  Regular rate and rhythm,  S1 and S2 normal,  no murmur, click, rub, or gallop  Rhythm: regular  Rate: normal         Dental  No notable dental hx       Pulmonary  Breath sounds clear to auscultation               Abdominal  GI exam deferred       Other Findings            Anesthetic Plan    ASA: 2  Anesthesia type: MAC          Induction: Intravenous  Anesthetic plan and risks discussed with: Patient

## 2019-07-18 NOTE — ANESTHESIA POSTPROCEDURE EVALUATION
Procedure(s):  COLONOSCOPY.     MAC    Anesthesia Post Evaluation      Multimodal analgesia: multimodal analgesia not used between 6 hours prior to anesthesia start to PACU discharge  Patient location during evaluation: bedside  Patient participation: complete - patient participated  Level of consciousness: awake  Pain score: 0  Airway patency: patent  Anesthetic complications: no  Cardiovascular status: acceptable and stable  Respiratory status: acceptable and room air  Hydration status: acceptable  Post anesthesia nausea and vomiting:  none      Vitals Value Taken Time   /65 7/18/2019  9:59 AM   Temp     Pulse 16 7/18/2019  9:59 AM   Resp 15 7/18/2019  9:58 AM   SpO2 98 % 7/18/2019  9:58 AM

## 2019-09-26 ENCOUNTER — APPOINTMENT (OUTPATIENT)
Dept: PHYSICAL THERAPY | Age: 34
End: 2019-09-26

## 2019-10-08 ENCOUNTER — HOSPITAL ENCOUNTER (OUTPATIENT)
Dept: PHYSICAL THERAPY | Age: 34
Discharge: HOME | End: 2019-10-08
Payer: MEDICAID

## 2019-10-08 PROCEDURE — 92523 SPEECH SOUND LANG COMPREHEN: CPT

## 2019-10-08 NOTE — PROGRESS NOTES
ST DAILY TREATMENT NOTE    Patient Name: Sarah Beth Londono  Date:10/8/2019  : 1985  [x]  Patient  Verified  Payor: Payor: Sofia Browning / Plan: [employer] / Product Type: Managed Care Medicaid /   In time:130  Out time:230  Total Treatment Time (min): 0  Visit #: 1 of 8-10    Treatment Diagnosis: Dysphagia [R13.10]    SUBJECTIVE  Pain Level (0-10 scale): 0  Any medication changes, allergies to medications, adverse drug reactions, diagnosis change, or new procedure performed?: [x] No    [] Yes (see summary sheet for update)    Subjective functional status/changes:   [] No changes reported  Patient eager to participate in evaluation    OBJECTIVE  Treatment provided includes:  Increase/Improve:  []  Voice Quality []  Cognitive Linguistic Skills []  Laryngeal/Pharyngeal Exercises   []  Vocal Loudness []  Reading Comprehension []  Swallowing Skills    []  Vocal Cord Function []  Auditory Comprehension []  Oral Motor Skills   []  Resonance []  Writing Skills []  Compensatory strategies    []  Speech Intelligibility []  Expressive Language []  Attention   []  Breath Support/Coord.  []  Receptive language []  Memory   []  Articulation []  Safety Awareness []    []  Fluency []  Word Retrieval []        Treatment Provided:  -no therapy initiated this day    Patient/Caregiver  Education: [] Review HEP      HEP/Handouts given: no HEP initiated this day    Pain Level (0-10 scale) post treatment: 0    ASSESSMENT   []   Improving appropriately and progressing toward goals  []   Improving slowly and progressing toward goals  []   Approximating goals/maximum potential  [x]   Continues to benefit from skilled therapy to address remaining functional deficits  []   Not progressing toward goals and plan of care will be adjusted    Patient will continue to benefit from skilled therapy to address remaining functional deficits: cognitive deficits    Progress towards goals / Updated goals:  Patient participated in cognitive Evaluation this day. The patient would benefit from skilled ST services; See POC for details. PLAN  [x]  Continue plan of care  []  Modify Goals/Treatment Plan      []  Discharge due to:  [] Other:    Short-Term Goals:  Pt will:   1. Demonstrate comprehension of internal/external memory enhancing techniques in 4/5 opportunities José  2. Complete functional working memory tasks, utilizing compensatory external techniques, with mod I, 90% of time in 3 of 4 trials  3. Complete mod-complex language/reasoning/problem solving tasks (written and verbal) in 4/5 trials with min-mod A with visual/verbal cues. 4. Recall 3-5 words after delay/distraction to  increase recall abilities as related to home environment with min-mod A in 4/5 trials with visual/verbal cues. 5 Recall passages to increase recall abilities as related to work environment with min-mod A in 4/5 trials with visual/verbal cues.   6. Complete visual-reading tasks related to functional ADLs for safe, independent social reintegration with min A      [id number] [facility] SLP 10/8/2019  2:38 PM    Future Appointments   Date Time Provider Minal Carson   10/10/2019  9:00 AM Chad [facility]   [facility]17[facility]2019  [facility]   10/22/2019 10:30 AM Chad [facility]   10/31/2019  9:00 AM Rizwan Gavin [facility]

## 2019-10-08 NOTE — PROGRESS NOTES
[facility]  [address]  Ph: [phone]  Fax: [phone]    Plan of Care/ Statement of Necessity for Speech Therapy Services    Patient name: Gustabo Phillips Start of Care: 10/8/2019   Referral source: Abiola Dickerson MD : 1985    Medical Diagnosis: Dysphagia [R13.10]   Onset Date:    Treatment Diagnosis: Cognitive Linguistic Deficit   Prior Hospitalization: see medical history Provider#: [id number]   Medications: Verified on Patient summary List    Comorbidities: scleroderma, fibromyalgia, depression/anxiety, arthritis, reynauds, vertigo   Prior Level of Function: independent, caring for 5 children    The Plan of Care and following information is based on the information from the initial evaluation. Assessment/ key information:   Patient is a pleasant 27year old female referred for speech and language evaluation d/t complaints of memory difficulty, difficulty reading, and pronouncing words. Patient reports she is highly distractable and cannot important information (ie scheduled activities, where she put her keys, etc). The patient completed a MRI, results WNL. She hsa three biological children and is taking care of her sisters two children. Her speech and memory significantly worsens with stress. She uses lists and calendars, but hopes to learn more memory enhancing techniques. The patient was assessed with the [facility] Cognitive Assessment as well as informal memory assessments. The [facility] Cognitive Assessment resulted in a score of 29/30 with difficulty with delayed recall, however the patient reported she took this test at the neurologists office and did much worse.  The patient answered orientation and awareness questions with 100% accuracy, immediate memory of lists of items with 100% accuracy, story retell with 25% accuracy, recent memory with 100% accuracy, long term memory with 100% accuracy, auditory processing with 100% accuracy, logic with 80% accuracy, reasoning with 40% accuracy, and inference with 100% accuracy. Defining words patient did not define, but instead gave an example (affectionate means \"hugging and kisses\"). Patient able to problem solve, however referenced anxiety/panic attacks first in 80% of situations. Reading and visual processing WFL, however when patient read sign \"Think loud\" in SLP's room, she stated \"think aloud\", and recognized her error. This is important to note for assisting children with homework as well as driving. 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 Problem List:   Impaired Cognition    Treatment Plan may include any combination of the following: Cognitive/Language Treatment      Patient / Family readiness to learn indicated by: asking questions    Persons(s) to be included in education:   patient (P)    Barriers to Learning/Limitations: yes;  emotional and cognitive    Patient Goal (s): I want to have normalcy with  My memory    Patient Self Reported Health Status: poor    Rehabilitation Potential: good    Short Term Goals: To be accomplished in treatments weeks  Pt will:   1. Demonstrate comprehension of internal/external memory enhancing techniques in 4/5 opportunities José  2. Complete functional working memory tasks, utilizing compensatory external techniques, with mod I, 90% of time in 3 of 4 trials  3. Complete mod-complex language/reasoning/problem solving tasks (written and verbal) in 4/5 trials with min-mod A with visual/verbal cues. 4. Recall 3-5 words after delay/distraction to  increase recall abilities as related to home environment with min-mod A in 4/5 trials with visual/verbal cues.   4. Recall passages to increase recall abilities as related to work environment with min-mod A in 4/5 trials with visual/verbal cues. 5. Complete visual-reading tasks related to functional ADLs for safe, independent social reintegration with min A      Long Term Goals: To be accomplished in 10-12 treatments   Patient will demonstrate improve cognitive functioning in 8/10 opportunities Tasha for improved fucntioning in ADLs and home life. Frequency / Duration: Patient to be seen 1-2 times per week for 4 weeks: The severity rating is based on the following outcomes:    National Outcomes Measures (NOMS)   Professional Judgement  [place] Cognitive Assessment    Patient/ Caregiver education and instruction: Diagnosis, prognosis, Compensatory Techniques,    Certification Period: 10/8/19-11/7/19    Ike Curling [place] 10/8/2019 2:37 PM  ______________________________________________________________________    I certify that the above Therapy Services are being furnished while the patient is under my care. I agree with the treatment plan and certify that this therapy is necessary. Physician's Signature:____________________  Date:___________Time:_________    Please sign and return to [facility]  [facility].   Ph: [phone]  Fax: [phone]

## 2019-10-10 ENCOUNTER — HOSPITAL ENCOUNTER (OUTPATIENT)
Dept: PHYSICAL THERAPY | Age: 34
Discharge: HOME | End: 2019-10-10
Payer: MEDICAID

## 2019-10-10 PROCEDURE — 92507 TX SP LANG VOICE COMM INDIV: CPT
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
## 2019-10-17 ENCOUNTER — HOSPITAL ENCOUNTER (OUTPATIENT)
Dept: PHYSICAL THERAPY | Age: 34
Discharge: HOME | End: 2019-10-17
Payer: MEDICAID

## 2019-10-17 PROCEDURE — 92507 TX SP LANG VOICE COMM INDIV: CPT

## 2019-10-17 NOTE — PROGRESS NOTES
ST DAILY TREATMENT NOTE    Patient Name: Blanche Alarcon  Date:10/17/2019  : 1985  [x]  Patient  Verified  Payor: Payor: Jorge Alberto Moses / Plan: [employer] / Product Type: Managed Care Medicaid /   In time:910  Out time:1000  Total Treatment Time (min): 50  Visit #: 3 of 8-10    Treatment Diagnosis: Dysphagia [R13.10]    SUBJECTIVE  Pain Level (0-10 scale): 0  Any medication changes, allergies to medications, adverse drug reactions, diagnosis change, or new procedure performed?: [x] No    [] Yes (see summary sheet for update)    Subjective functional status/changes:   [] No changes reported  Patient seen with 3year old son in the room. OBJECTIVE  Treatment provided includes:  Increase/Improve:  []  Voice Quality [x]  Cognitive Linguistic Skills []  Laryngeal/Pharyngeal Exercises   []  Vocal Loudness []  Reading Comprehension []  Swallowing Skills    []  Vocal Cord Function []  Auditory Comprehension []  Oral Motor Skills   []  Resonance []  Writing Skills []  Compensatory strategies    []  Speech Intelligibility []  Expressive Language []  Attention   []  Breath Support/Coord.  []  Receptive language [x]  Memory   []  Articulation []  Safety Awareness []    []  Fluency []  Word Retrieval []        Treatment Provided:  -delayed recall with 3-4 words (3-5 minute delay) with distraction with 100% accuracy in 2/2 opportunities  -visual attention task with 95% accuracy Tasha  -working memory Tasha with 100% accuracy + distractions  -utilized notebook to improve recall of important tasks Mark at home    Patient/Caregiver  Education: [x] Review HEP      HEP/Handouts given: utilize sticky notes, visual nightly reminder, organize \"important\" pile    Pain Level (0-10 scale) post treatment: 0    ASSESSMENT   []   Improving appropriately and progressing toward goals  [x]   Improving slowly and progressing toward goals  []   Approximating goals/maximum potential  [x]   Continues to benefit from skilled therapy to address remaining functional deficits  []   Not progressing toward goals and plan of care will be adjusted    Patient will continue to benefit from skilled therapy to address remaining functional deficits: cognition    Progress towards goals / Updated goals:  Patient is demonstrating steady progress towards goals. Patient's son in session providing natural, real life distractions where patient was able to demonstrate great use of recall strategies (rehearsal, talking to son about item to recall, etc). Patient continues to reports some visual attention deficits, will address with visual recall game next session. Real life working memory task of \"find it\" in [employer] with auditory prompts and 0 reminders. Patient Mark organzied to do list, continues to report NOT taking medication at a consistent time during the day. Patient to put some responsibility of visual reminders on kids (ie sign my papers sticky notes). PLAN  [x]  Continue plan of care  []  Modify Goals/Treatment Plan      []  Discharge due to:  [] Other:    Short-Term Goals:  Pt will:   1. Demonstrate comprehension of internal/external memory enhancing techniques in 4/5 opportunities José  2. Complete functional working memory tasks, utilizing compensatory external techniques, with mod I, 90% of time in 3 of 4 trials  3. Complete mod-complex language/reasoning/problem solving tasks (written and verbal) in 4/5 trials with min-mod A with visual/verbal cues. 4. Recall 3-5 words after delay/distraction to  increase recall abilities as related to home environment with min-mod A in 4/5 trials with visual/verbal cues. 5 Recall passages to increase recall abilities as related to work environment with min-mod A in 4/5 trials with visual/verbal cues.   6. Complete visual-reading tasks related to functional ADLs for safe, independent social reintegration with min A      [id number] [facility] SLP 10/17/2019  10:17 AM    Future Appointments   Date Time Provider Minal Carson   10[facility]22[facility]2019 10:30 [facility]   10/31/2019  9:00 AM [facility]   11/5/2019  8:30 AM [facility]   11/12/2019 11:00 AM [facility]   11/21/2019  9:30 AM [facility]   11/26/2019  1:00 PM [facility]

## 2019-10-22 ENCOUNTER — HOSPITAL ENCOUNTER (OUTPATIENT)
Dept: PHYSICAL THERAPY | Age: 34
Discharge: HOME | End: 2019-10-22
Payer: MEDICAID

## 2019-10-22 PROCEDURE — 92507 TX SP LANG VOICE COMM INDIV: CPT

## 2019-10-22 NOTE — PROGRESS NOTES
ST DAILY TREATMENT NOTE    Patient Name: Jessica Lowery  Date:10/22/2019  : 1985  [x]  Patient  Verified  Payor: Payor: Ricardo Roach / Plan: [employer] / Product Type: Managed Care Medicaid /   In time:1030  Out time:1100  Total Treatment Time (min): 30  Visit #: 4 of 8-10    Treatment Diagnosis: Dysphagia [R13.10]    SUBJECTIVE  Pain Level (0-10 scale): 0  Any medication changes, allergies to medications, adverse drug reactions, diagnosis change, or new procedure performed?: [x] No    [] Yes (see summary sheet for update)    Subjective functional status/changes:   [] No changes reported  Patient attended session with son. OBJECTIVE  Treatment provided includes:  Increase/Improve:  []  Voice Quality [x]  Cognitive Linguistic Skills []  Laryngeal/Pharyngeal Exercises   []  Vocal Loudness []  Reading Comprehension []  Swallowing Skills    []  Vocal Cord Function []  Auditory Comprehension []  Oral Motor Skills   []  Resonance []  Writing Skills []  Compensatory strategies    []  Speech Intelligibility []  Expressive Language []  Attention   []  Breath Support/Coord.  []  Receptive language []  Memory   []  Articulation []  Safety Awareness []    []  Fluency []  Word Retrieval []        Treatment Provided:  -completed moderate problem solving tasks Mark in 80% of opportunities, 100% José  -recall internal.external memory enhancing techniques Mark, continue to encourage to utilize post it notes for a physical reminder    Patient/Caregiver  Education: [x] Review HEP      HEP/Handouts given: problem solving tasks, post it notes    Pain Level (0-10 scale) post treatment: 0    ASSESSMENT   [x]   Improving appropriately and progressing toward goals  []   Improving slowly and progressing toward goals  []   Approximating goals/maximum potential  [x]   Continues to benefit from skilled therapy to address remaining functional deficits  []   Not progressing toward goals and plan of care will be adjusted    Patient will continue to benefit from skilled therapy to address remaining functional deficits: cognitive linguistic deficits    Progress towards goals / Updated goals:  Patient is demonstrating slow, but steady progress towards goals. The patient has demonstrated improved focus to task despite distractions, improved redirection to task when child distracts her. The patient stated that she became nervous and shaky when doing the tasks, however once she \"breathed\" she was able to think more clearly. Patient was bombarded with problem solving/executive function tasks (+) music (+) child in background, she need x1 verbal cue in problem solving task to reread and utilize crossing out to assist thought process. PLAN  [x]  Continue plan of care  []  Modify Goals/Treatment Plan      []  Discharge due to:  [] Other:    Short-Term Goals:  Pt will:   1. Demonstrate comprehension of internal/external memory enhancing techniques in 4/5 opportunities José  2. Complete functional working memory tasks, utilizing compensatory external techniques, with mod I, 90% of time in 3 of 4 trials  3. Complete mod-complex language/reasoning/problem solving tasks (written and verbal) in 4/5 trials with min-mod A with visual/verbal cues. 4. Recall 3-5 words after delay/distraction to  increase recall abilities as related to home environment with min-mod A in 4/5 trials with visual/verbal cues. 5 Recall passages to increase recall abilities as related to work environment with min-mod A in 4/5 trials with visual/verbal cues.   6. Complete visual-reading tasks related to functional ADLs for safe, independent social reintegration with min A      [id number] [facility] SLP 10/22/2019  11:05 AM    Future Appointments   Date Time Provider Minal Carson   10/31/2019  9:00 AM Chad CAMPOS [facility]   11/5/2019  8:30 AM Rizwan Gavin [facility]   11/12/2019 11:00 AM Rizwan Gavin [facility]   11/21/2019 9:30 AM [facility]   11/26/2019  1:00 PM [facility]

## 2019-10-31 ENCOUNTER — APPOINTMENT (OUTPATIENT)
Dept: PHYSICAL THERAPY | Age: 34
End: 2019-10-31
Payer: MEDICAID

## 2019-11-05 ENCOUNTER — HOSPITAL ENCOUNTER (OUTPATIENT)
Dept: PHYSICAL THERAPY | Age: 34
Discharge: HOME | End: 2019-11-05
Payer: MEDICAID

## 2019-11-05 PROCEDURE — 92507 TX SP LANG VOICE COMM INDIV: CPT

## 2019-11-05 NOTE — PROGRESS NOTES
ST DAILY TREATMENT NOTE     Patient Name: Phil Rush  Date:2019  : 1985  [x]  Patient  Verified  Payor: Payor: Marlena Chisholm / Plan: Charly Draft[employer] / Product Type: Managed Care Medicaid /   In time :  Out time:935  Total Treatment Time (min): 42  Visit #: 5 of 8-10     Treatment Diagnosis: Dysphagia [R13.10]     SUBJECTIVE  Pain Level (0-10 scale): 0  Any medication changes, allergies to medications, adverse drug reactions, diagnosis change, or new procedure performed?: [x] No    [] Yes (see summary sheet for update)     Subjective functional status/changes:   [x] No changes reported       OBJECTIVE  Treatment provided includes:  Increase/Improve:  []  Voice Quality [x]  Cognitive Linguistic Skills []  Laryngeal/Pharyngeal Exercises   []  Vocal Loudness []  Reading Comprehension []  Swallowing Skills    []  Vocal Cord Function []  Auditory Comprehension []  Oral Motor Skills   []  Resonance []  Writing Skills []  Compensatory strategies    []  Speech Intelligibility []  Expressive Language []  Attention   []  Breath Support/Coord. []  Receptive language []  Memory   []  Articulation []  Safety Awareness []    []  Fluency []  Word Retrieval []          Treatment Provided:  Pt able to:  - complete moderate reasoning tasks with 88% accuracy independently, 100% with self correction. - recall internal external memory enhancing techniques: pt reported utilizing post it notes at home, reviewed several different memory tactics to utilize to remember things like groceries in the back of the car and where she places car keys and cell phone.  - initiate logic reasoning task which pt was unable to complete due to level difficulty.   Handout provided as HEP, which is to be completed and brought back next visit.       Patient/Caregiver  Education: [x] Review HEP\  Logic/ reasoning tasks to be completed and brought back next session.          Pain Level (0-10 scale) post treatment: 0     ASSESSMENT   [x]   Improving appropriately and progressing toward goals  []   Improving slowly and progressing toward goals  []   Approximating goals/maximum potential  [x]   Continues to benefit from skilled therapy to address remaining functional deficits  []   Not progressing toward goals and plan of care will be adjusted     Patient will continue to benefit from skilled therapy to address remaining functional deficits: cognitive linguistic deficits     Progress towards goals / Updated goals:  Patient is demonstrating slow, but steady progress towards goals. The patient was able to focus 100% independently to tasks at hand. Patient was bombarded with problem solving/executive function/ reasoning tasks, and able to self correct errors with modified independence.     PLAN  [x]  Continue plan of care  []  Modify Goals/Treatment Plan      []  Discharge due to:  [] Other:     Short-Term Goals:  Pt will:   1. Demonstrate comprehension of internal/external memory enhancing techniques in 4/5 opportunities José  2. Complete functional working memory tasks, utilizing compensatory external techniques, with mod I, 90% of time in 3 of 4 trials  3. Complete mod-complex language/reasoning/problem solving tasks (written and verbal) in 4/5 trials with min-mod A with visual/verbal cues. 4. Recall 3-5 words after delay/distraction to  increase recall abilities as related to home environment with min-mod A in 4/5 trials with visual/verbal cues. 5 Recall passages to increase recall abilities as related to work environment with min-mod A in 4/5 trials with visual/verbal cues.   6. Complete visual-reading tasks related to functional ADLs for safe, independent social reintegration with min A        Amparo Gregg MS, CCC/SLP  Speech- Language Pathologist  11/5/2019                              Future Appointments   Date Time Provider Minal Carson   10/31/2019  9:00 AM Edith Rodriguez A [facility]   11/5/2019  8:30 AM [facility]   11/12/2019 11:00 AM [facility]   11/21/2019  9:30 AM Leslye [facility]   [facility]26/2019  [facility]

## 2019-11-12 ENCOUNTER — HOSPITAL ENCOUNTER (OUTPATIENT)
Dept: PHYSICAL THERAPY | Age: 34
Discharge: HOME | End: 2019-11-12
Payer: MEDICAID

## 2019-11-12 PROCEDURE — 92507 TX SP LANG VOICE COMM INDIV: CPT

## 2019-11-12 NOTE — PROGRESS NOTES
[facility] @ [address]  Ph: [phone]  Fax: [phone]    Continued Plan of Care/ Re-certification for Speech Therapy Services    Patient name: Dg Quinones Start of Care: 10/8/19   Referral source: Indiana Hernandez MD : 1985   Medical/Treatment Diagnosis: Dysphagia [R13.10] Onset Date:     Prior Hospitalization: see medical history Provider#: [id number]   Medications: Verified on Patient Summary List    Comorbidities: scleroderma, fibromyalgia, depression/anxiety, arthritis, reynauds, vertigo  Prior Level of Function:independent, caring for 5 children  Visits from Start of Care: 6    Missed Visits: 0    The Plan of Care and following information is based on the patient's current status:  Goal: 1. Demonstrate comprehension of internal/external memory enhancing techniques in 4/5 opportunities José  Status at last note/certification: Goal created at evaluation  Current Status: met -patient reported improved use of extrinsic memory strategies of post it notes, continued use of notebook, and use of alarms    Goal: 2. Complete functional working memory tasks, utilizing compensatory external techniques, with mod I, 90% of time in 3 of 4 trials  Status at last note/certification:Goal created at evaluation  Current Status: met -working memory Tasha with 100% accuracy + distractions    Goal: 3. Complete mod-complex language/reasoning/problem solving tasks (written and verbal) in 4/5 trials with min-mod A with visual/verbal cues. Status at last note/certification:Goal created at evaluation  Current Status: met -completed mental flexibility/executive functioning task with 100% accuracy Mark, decreased to 60% accuracy Mark with introduction of auditory distractions in the backgroun    Goal: 4.  Recall 3-5 words after delay/distraction to  increase recall abilities as related to home environment with min-mod A in 4/5 trials with visual/verbal cues.  Status at last note/certification:Goal created at evaluation  Current Status: met -delayed recall with 3-4 words (3-5 minute delay) with distraction with 100% accuracy in 2/2 opportunities    Goal: 5 Recall passages to increase recall abilities as related to work environment with min-mod A in 4/5 trials with visual/verbal cues. Status at last note/certification:Goal created at evaluation  Current Status: not met not yet addressed    Goal: 6. Complete visual-reading tasks related to functional ADLs for safe, independent social reintegration with min A  Status at last note/certification:Goal created at evaluation  Current Status: not met- not yet addressed    Key functional changes: Patient in demonstrating steady progress towards cognition goals. The patient continues to report use of extrinsic memory strategies at home which she states has improved her memory; the patient reports using post its as reminders, as well as checklists and to do lists for increased mental organization. SLP encouraged patient to clear post its daily to decrease visual clutter and maintain importance of task completion with use of post its. The patient reports she has began taking her medication regularly at the same time with use of strategies. The patient continues to complain of cognitive functioning with increased distractions. SLP has introduced using self provided auditory cues and visual cues which improves mental flexibility and processing. SLP will recertify patient to address high level mental tasks with increasing distraction to mimic real life situations. Problems/ barriers to goal attainment: cognition     Problem List: Impaired Cognition    Treatment Plan: Cognitive/Language Treatment    Patient Goal (s) has been updated and includes: I want to be able to tune out the chaos and focus     Goals for this certification period to be accomplished in 4-8 treatments:  Patient will:   1.  Complete mod-complex language/reasoning/problem solving tasks (written and verbal) in 4/5 trials with José-Tasha with visual/verbal cues (+) increased level of distractions. 2. Recall passages to increase recall abilities as related to work environment with min-mod A in 4/5 trials with visual/verbal cues. 3. Complete visual-reading tasks related to functional ADLs for safe, independent social reintegration with min A  (+) distraction. Frequency / Duration: Patient to be seen 1-2 times per week for 4 weeks:    Assessment/Recommendations: Continue skilled ST services to address high level cognition      The severity rating is based on the following outcomes:    National Outcomes Measures (NOMS)   Professional Judgement  [employer] Cognitive Assessment    Certification Period: 11/12/19-12/12/19    [facility] 11/12/2019 2:21 PM    _____________________________________________________________________    I certify that the above Therapy Services are being furnished while the patient is under my care. I agree with the treatment plan and certify that this therapy is necessary. []  I have read the above report and request that my patient continue as recommended. []  I have read the above report and request that my patient continue therapy with the following changes/special instructions:________________________________________  []I have read the above report and request that my patient be discharged from therapy.     Physician's Signature:_________________ Date:___________Time:__________        Please sign and return via fax to [facility] at [facility]  Fax: [phone]

## 2019-11-12 NOTE — PROGRESS NOTES
ST DAILY TREATMENT NOTE    Patient Name: Marcela Jiménez  Date:2019  : 1985  [x]  Patient  Verified  Payor: Payor: [employer] / Plan: [place] / Product Type: Managed Care Medicaid /   In time:1115  Out time:1200  Total Treatment Time (min): 45  Visit #: 6 of 8-10    Treatment Diagnosis: Dysphagia [R13.10]    SUBJECTIVE  Pain Level (0-10 scale): 0  Any medication changes, allergies to medications, adverse drug reactions, diagnosis change, or new procedure performed?: [x] No    [] Yes (see summary sheet for update)    Subjective functional status/changes:   [] No changes reported  Patient reported she had difficulty completing mental flexibility task with distractions in the back ground. OBJECTIVE  Treatment provided includes:  Increase/Improve:  []  Voice Quality [x]  Cognitive Linguistic Skills []  Laryngeal/Pharyngeal Exercises   []  Vocal Loudness []  Reading Comprehension []  Swallowing Skills    []  Vocal Cord Function []  Auditory Comprehension []  Oral Motor Skills   []  Resonance []  Writing Skills []  Compensatory strategies    []  Speech Intelligibility []  Expressive Language []  Attention   []  Breath Support/Coord.  []  Receptive language [x]  Memory   []  Articulation []  Safety Awareness []    []  Fluency []  Word Retrieval []        Treatment Provided:  -patient reported improved use of extrinsic memory strategies of post it notes, continued use of notebook, and use of alarms  -completed mental flexibility/executive functioning task with 100% accuracy Mark, decreased to 60% accuracy Mark with introduction of auditory distractions in the backgroun    Patient/Caregiver  Education: [x] Review HEP      HEP/Handouts given: [employer] puzzles with music in the background    Pain Level (0-10 scale) post treatment: 0    ASSESSMENT   [x]   Improving appropriately and progressing toward goals  []   Improving slowly and progressing toward goals  []   Approximating goals/maximum potential  [x]   Continues to benefit from skilled therapy to address remaining functional deficits  []   Not progressing toward goals and plan of care will be adjusted    Patient will continue to benefit from skilled therapy to address remaining functional deficits: cognition    Progress towards goals / Updated goals:  Patient in demonstrating steady progress towards cognition goals. The patient continues to report use of extrinsic memory strategies at home which she states has improved her memory; the patient reports using post its as reminders, as well as checklists. SLP encouraged patient to clear post its daily to decrease visual clutter and maintain importance of task completion with use of post its. The patient reports she has began taking her medication regularly at the same time with use of strategies. The patient continues to complain of cognitive functioning with increased distractions. SLP has introduced using self provided auditory cues and visual cues which improves mental flexibility and processing. SLP will recertify patient to address high level mental tasks with increasing distraction to mimic real life situations. PLAN  []  Continue plan of care  [x]  Modify Goals/Treatment Plan      []  Discharge due to:  [] Other:    Short-Term Goals:  Pt will:   1. Demonstrate comprehension of internal/external memory enhancing techniques in 4/5 opportunities José  2. Complete functional working memory tasks, utilizing compensatory external techniques, with mod I, 90% of time in 3 of 4 trials  3. Complete mod-complex language/reasoning/problem solving tasks (written and verbal) in 4/5 trials with min-mod A with visual/verbal cues. 4. Recall 3-5 words after delay/distraction to  increase recall abilities as related to home environment with min-mod A in 4/5 trials with visual/verbal cues.   5 Recall passages to increase recall abilities as related to work environment with min-mod A in 4/5 trials with visual/verbal cues.   6. Complete visual-reading tasks related to functional ADLs for safe, independent social reintegration with min BETH      [id number] [facility] SLP 11/12/2019  2:12 PM    Future Appointments   Date Time Provider Minal Carson   11/21/2019  9:30 AM Mariusz CAMPOS [facility]   11/26/2019  1:00 PM Crista Covington [facility]

## 2019-11-26 ENCOUNTER — HOSPITAL ENCOUNTER (OUTPATIENT)
Dept: PHYSICAL THERAPY | Age: 34
Discharge: HOME | End: 2019-11-26
Payer: MEDICAID

## 2019-11-26 PROCEDURE — 92507 TX SP LANG VOICE COMM INDIV: CPT

## 2019-11-26 NOTE — PROGRESS NOTES
[facility] [address]  Ph: [phone]  Fax: [phone]    Speech Therapy Discharge Summary    Patient name: Kam Fitzpatrick Start of Care: 10/8/19   Referral source: Demi Matias MD : 1985   Medical/Treatment Diagnosis: Dysphagia [R13.10] Onset Date:     Prior Hospitalization: see medical history Provider#: [id number]   Medications: Verified on Patient Summary List    Comorbidities: scleroderma, fibromyalgia, depression/anxiety, arthritis, reynauds, vertigo  Prior Level of Function:independent, caring for 5 children    Visits from Start of Care: 7    Missed Visits: 0    Reporting Period : 19 to 19    Summary of Care:  Goal: 1. Complete mod-complex language/reasoning/problem solving tasks (written and verbal) in 4/5 trials with José-Tasha with visual/verbal cues (+) increased level of distractions. Status at last note/certification: completed mental flexibility/executive functioning task with 100% accuracy Mark, decreased to 60% accuracy Mark with introduction of auditory distractions in the backgroun  Status at discharge: met -mental flexibility/reasoning task with 100% accuracy Tasha (+) distraction    Goal: 2. Recall passages to increase recall abilities as related to work environment with min-mod A in 4/5 trials with visual/verbal cues. Status at last note/certification: not yet addressed  Status at discharge: met recall passages with 90% accuracy Mark (+) distraction    Goal: 3. Complete visual-reading tasks related to functional ADLs for safe, independent social reintegration with min A  (+) distraction. Status at last note/certification: not yet addressed  Status at discharge: met -visual/reading task with 90% accuracy Mark (+) distraction       ASSESSMENT: Patient has met all goals and will be discharged from therapy.  Patient completed cognitive therapy addressing memory, mental flexibility and executive functioning. Over the course of therapy the patient has demonstrated increased understanding and use of memory enhancing techniques, specifically external strategies. The patient has demonstrated continued generalization of attention to task and mental flexibility with increased auditory and visual distractions. At this time, the patient has met all goals and has agreed to continue use of strategies and continuing logic puzzles (+) distractions for practice of increased attention. Patient feels at this point she has learned greatly within the therapy sessions, however needs to continue to address the strategies at home for carryover. Accordingly, SLP will discharge.     RECOMMENDATIONS:  [x]Discontinue therapy: [x]Patient has reached or is progressing toward set goals      []Patient is non-compliant or has abdicated      []Due to lack of appreciable progress towards set [address] [facility] SLP 11/26/2019 3:04 PM

## 2019-11-26 NOTE — PROGRESS NOTES
ST DAILY TREATMENT NOTE    Patient Name: Delaney Galvez  Date:2019  : 1985  [x]  Patient  Verified  Payor: Payor: Koko Wynn / Plan: [place] / Product Type: Managed Care Medicaid /   In time:100  Out time:200  Total Treatment Time (min): 60  Visit #: 1 of 4-6    Treatment Diagnosis: Dysphagia [R13.10]    SUBJECTIVE  Pain Level (0-10 scale): 0  Any medication changes, allergies to medications, adverse drug reactions, diagnosis change, or new procedure performed?: [x] No    [] Yes (see summary sheet for update)    Subjective functional status/changes:   [] No changes reported  Patient repots she is seeing improvements at home    OBJECTIVE  Treatment provided includes:  Increase/Improve:  []  Voice Quality [x]  Cognitive Linguistic Skills []  Laryngeal/Pharyngeal Exercises   []  Vocal Loudness []  Reading Comprehension []  Swallowing Skills    []  Vocal Cord Function []  Auditory Comprehension []  Oral Motor Skills   []  Resonance []  Writing Skills []  Compensatory strategies    []  Speech Intelligibility []  Expressive Language []  Attention   []  Breath Support/Coord.  []  Receptive language []  Memory   []  Articulation []  Safety Awareness []    []  Fluency []  Word Retrieval []        Treatment Provided:  -visual/reading task with 90% accuracy Mark  -mental flexibility/reasoning task with 100% accuracy Tasha  -recall passages with 90% accuracy Mark    Patient/Caregiver  Education: [x] Review HEP      HEP/Handouts given: recall, mental flexibility increasing distractions    Pain Level (0-10 scale) post treatment: 0    ASSESSMENT   []   Improving appropriately and progressing toward goals  []   Improving slowly and progressing toward goals  [x]   Approximating goals/maximum potential  []   Continues to benefit from skilled therapy to address remaining functional deficits  []   Not progressing toward goals and plan of care will be adjusted    Patient will continue to benefit from skilled therapy to address remaining functional deficits: cognitive linguistic deficit    Progress towards goals / Updated goals:  Patient has met all goals and will be discharged from services. The patient completed all tasks this day with visual and auditory distractions, within therapy room and in open gym area. The patient was able to demonstrate a hierarchy of self cues from reading aloud, to slowing down and reanalyzing the questions. The patient demonstrates improvement in memory with use of internal memory strategies of ID key information, and external memory strategies of writing information down. The patient feels she has made progress in therapy and plans to continue to carryover techniques at home. Educated patient on HEP to include increasing attention and self cues; verbalized and demonstrated comprehension. SLP will discharge patient. PLAN  []  Continue plan of care  []  Modify Goals/Treatment Plan      [x]  Discharge due to: met all goals. [] Other:    Short-Term Goals:  1. Complete mod-complex language/reasoning/problem solving tasks (written and verbal) in 4/5 trials with José-Tasha with visual/verbal cues (+) increased level of distractions. 2. Recall passages to increase recall abilities as related to work environment with min-mod A in 4/5 trials with visual/verbal cues. 3. Complete visual-reading tasks related to functional ADLs for safe, independent social reintegration with min A  (+) distraction. Twan Delcid [facility] SLP 11/26/2019  1:50 PM    No future appointments.

## 2025-04-08 ENCOUNTER — OFFICE VISIT (OUTPATIENT)
Age: 40
End: 2025-04-08

## 2025-04-08 VITALS — BODY MASS INDEX: 30 KG/M2 | HEIGHT: 62 IN | WEIGHT: 163 LBS

## 2025-04-08 DIAGNOSIS — M67.921 BICEPS TENDINOPATHY, RIGHT: Primary | ICD-10-CM

## 2025-04-08 NOTE — PROGRESS NOTES
Patricia Tanner  1985   Chief Complaint   Patient presents with    Shoulder Pain     Right shoulder pain        HISTORY OF PRESENT ILLNESS  Patricia Tanner is a 39 y.o. female who presents today for evaluation of right shoulder pain.  Pain is a 7/10. Pain has been present for 1 year. Her pain began when she was reaching behind her to grab her purse a year ago. Having pain at night time and with movement. She has tried PT with little benefit. She has tried injections without relief. She is on chemotherapy. She takes Ibuprofen 800 mg.     Has tried following treatments: Injections:Yes; Brace:No; Therapy:Yes; Cane/Crutch:No      No Known Allergies     Past Medical History:   Diagnosis Date    Connective tissue disease, undifferentiated     GERD (gastroesophageal reflux disease)     H/O seasonal allergies     Psychiatric disorder     depression    Raynaud's disease     Scleroderma, limited (HCC)     Vertigo       Social History       Tobacco History       Smoking Status  Light Smoker      Smokeless Tobacco Use  Never      Tobacco Comments  Quit smoking: ONLY 1-3 CIGS DAILY              Alcohol History       Alcohol Use Status  No              Drug Use       Drug Use Status  No              Sexual Activity       Sexually Active  Not Asked                   Past Surgical History:   Procedure Laterality Date     SECTION      CHOLECYSTECTOMY      COLONOSCOPY N/A 2019    COLONOSCOPY performed by Jasbir Connelly MD at [facility]    TUBAL LIGATION      WISDOM TOOTH EXTRACTION        History reviewed. No pertinent family history.  No current outpatient medications on file.     No current facility-administered medications for this visit.       REVIEW OF SYSTEM   Patient denies: Weight loss, Fever/Chills, HA, Visual changes, Fatigue, Chest pain, SOB, Abdominal pain, N/V/D/C, Blood in stool or urine, Edema.   Pertinent positive as above in HPI. All others were negative    PHYSICAL

## 2025-04-28 ENCOUNTER — TELEPHONE (OUTPATIENT)
Age: 40
End: 2025-04-28

## 2025-04-28 NOTE — TELEPHONE ENCOUNTER
Patient is requesting a call back because she wants to know if she will receive an injection on 5/5/25 appt because if so she do not think she can go through with receiving an injection, patient states that her anxiety is freaking her out thinking about an injection.    Patient tel [phone]

## (undated) DEVICE — FLEX ADVANTAGE 1500CC: Brand: FLEX ADVANTAGE

## (undated) DEVICE — KIT COLON W/ 1.1OZ LUB AND 2 END

## (undated) DEVICE — MEDI-VAC NON-CONDUCTIVE SUCTION TUBING: Brand: CARDINAL HEALTH

## (undated) DEVICE — BASIN EMESIS 500CC ROSE 250/CS 60/PLT: Brand: MEDEGEN MEDICAL PRODUCTS, LLC

## (undated) DEVICE — FORCEPS BX L240CM JAW DIA2.8MM L CAP W/ NDL MIC MESH TOOTH

## (undated) DEVICE — BITE BLK ENDOSCP AD 54FR GRN POLYETH ENDOSCP W STRP SLD

## (undated) DEVICE — CONTAINER PREFIL FRMLN 15ML --

## (undated) DEVICE — SYR 50ML SLIP TIP NSAF LF STRL --

## (undated) DEVICE — KENDALL 500 SERIES DIAPHORETIC FOAM MONITORING ELECTRODE - TEAR DROP SHAPE ( 30/PK): Brand: KENDALL

## (undated) DEVICE — DEVICE INFL 60ML 12ATM CONVENIENT LOK REL HNDL HI PRSS FLX